# Patient Record
Sex: FEMALE | Race: AMERICAN INDIAN OR ALASKA NATIVE
[De-identification: names, ages, dates, MRNs, and addresses within clinical notes are randomized per-mention and may not be internally consistent; named-entity substitution may affect disease eponyms.]

---

## 2018-03-01 ENCOUNTER — HOSPITAL ENCOUNTER (EMERGENCY)
Dept: HOSPITAL 5 - ED | Age: 63
LOS: 1 days | Discharge: HOME | End: 2018-03-02
Payer: COMMERCIAL

## 2018-03-01 DIAGNOSIS — E11.9: ICD-10-CM

## 2018-03-01 DIAGNOSIS — I10: Primary | ICD-10-CM

## 2018-03-01 DIAGNOSIS — I11.0: ICD-10-CM

## 2018-03-01 DIAGNOSIS — I50.9: ICD-10-CM

## 2018-03-01 DIAGNOSIS — M54.32: ICD-10-CM

## 2018-03-01 PROCEDURE — 96372 THER/PROPH/DIAG INJ SC/IM: CPT

## 2018-03-01 PROCEDURE — 73560 X-RAY EXAM OF KNEE 1 OR 2: CPT

## 2018-03-01 PROCEDURE — 73502 X-RAY EXAM HIP UNI 2-3 VIEWS: CPT

## 2018-03-01 PROCEDURE — 99283 EMERGENCY DEPT VISIT LOW MDM: CPT

## 2018-03-01 NOTE — XRAY REPORT
FINAL REPORT



EXAM:  XR HIP 2-3V RT



HISTORY:  Right hip and knee pain. 



TECHNIQUE:  Two views: AP view of the pelvis and frogleg view of

the right hip. 



PRIORS:  None available 



FINDINGS:  

Decreased osseous mineralization. There is no displaced fracture

involving either hip or the osseous pelvis. Mild bilateral hip

degenerative changes and sacroiliac joint degenerative changes.

Multiple coarse calcifications overlie the sacrum, probable

fibroid uterus. Atherosclerotic vascular calcifications of the

femoral arteries and left groin surgical clips present. 



IMPRESSION:  

No acute fracture or dislocation. 



Probable calcified uterine fibroids. 



Comment: If high index of suspicion for hip fracture,

cross-sectional imaging can further evaluate.

## 2018-03-01 NOTE — XRAY REPORT
FINAL REPORT



PROCEDURE:  XR KNEE 1-2V RT



TECHNIQUE:  RIGHT knee radiographs, AP and lateral views. CPT

[id number]







HISTORY:  Right hip pain. 



COMPARISON:  No prior studies are available for comparison.



FINDINGS:  

Fracture (s) and/or Dislocation(s): None .



Alignment: Normal .



Joint space(s): Severe medial compartment narrowing with

sclerosis and subchondral cystic change. Small osteophytes.

Moderate lateral and patellofemoral narrowing and osteophytes.

Small patellar enthesophyte. Small joint effusion. 



Soft tissues: Vascular calcification.



Bone mineralization: Osteopenia.



Foreign bodies: None .







IMPRESSION:  

Osteopenia and degenerative changes of the right knee..

## 2018-03-01 NOTE — EMERGENCY DEPARTMENT REPORT
ED General Adult HPI





- General


Chief complaint: Extremity Injury, Lower


Stated complaint: RT LEG PAIN


Time Seen by Provider: 03/01/18 23:26


Source: patient, family


Mode of arrival: Wheelchair


Limitations: Physical Limitation





- History of Present Illness


Initial comments: 





Patient is 62 years old female history of hypertension diabetes and CABG.  

Patient presented to the ER with chief complaint of right lower extremity pain 

for the last 3 months patient hadsciatica in neuropathy as she is on gabapentin 

she stated that her symptoms are getting worse.  Patient denied any new injury.

  No shortness of breath or swelling in the leg.  Patient also had high blood 

pressure 213/150 she stated that she took her blood pressure medicine today.





- Related Data


 Allergies











Allergy/AdvReac Type Severity Reaction Status Date / Time


 


No Known Allergies Allergy   Unverified 03/01/18 19:24














ED Review of Systems


ROS: 


Stated complaint: RT LEG PAIN


Other details as noted in HPI





Comment: All other systems reviewed and negative


Constitutional: denies: chills, fever


Respiratory: denies: cough, orthopnea, shortness of breath, SOB with exertion


Cardiovascular: denies: chest pain, palpitations, dyspnea on exertion, orthopnea


Gastrointestinal: denies: abdominal pain, nausea, vomiting, diarrhea


Genitourinary: denies: urgency, dysuria, frequency


Musculoskeletal: arthralgia.  denies: back pain


Neurological: denies: headache, weakness, numbness, paresthesias





ED Past Medical Hx





- Past Medical History


Previous Medical History?: Yes


Hx Hypertension: Yes


Hx Congestive Heart Failure: Yes


Hx Diabetes: Yes





- Surgical History


Past Surgical History?: Yes


Additional Surgical History: Heart





- Social History


Smoking Status: Never Smoker


Substance Use Type: None





ED Physical Exam





- General


Limitations: Physical Limitation


General appearance: alert, in no apparent distress





- Head


Head exam: Present: atraumatic, normocephalic





- Eye


Eye exam: Present: normal appearance, PERRL





- ENT


ENT exam: Present: normal exam, normal orophraynx, mucous membranes moist





- Neck


Neck exam: Present: normal inspection, full ROM.  Absent: tenderness, 

meningismus





- Respiratory


Respiratory exam: Present: normal lung sounds bilaterally.  Absent: respiratory 

distress, wheezes, rales, rhonchi, accessory muscle use, decreased breath sounds

, prolonged expiratory





- Cardiovascular


Cardiovascular Exam: Present: regular rate, normal rhythm, normal heart sounds





- GI/Abdominal


GI/Abdominal exam: Present: soft, normal bowel sounds.  Absent: distended, 

tenderness, guarding, rebound, rigid, organomegaly, mass, bruit, pulsatile mass





- Extremities Exam


Extremities exam: Present: normal inspection, full ROM, normal capillary 

refill.  Absent: tenderness, pedal edema





- Back Exam


Back exam: Present: normal inspection, full ROM.  Absent: CVA tenderness (R), 

CVA tenderness (L), muscle spasm, paraspinal tenderness, vertebral tenderness, 

rash noted





- Neurological Exam


Neurological exam: Present: alert, oriented X3, CN II-XII intact, normal gait





- Skin


Skin exam: Present: warm, intact, normal color





ED Course





 Vital Signs











  03/01/18





  19:24


 


Temperature 98.2 F


 


Pulse Rate 104 H


 


Respiratory 18





Rate 


 


Blood Pressure 219/107


 


O2 Sat by Pulse 97





Oximetry 














ED Medical Decision Making





- Radiology Data


Radiology results: report reviewed





X-ray of the right hip and right knee showed no acute fracture or dislocation.


Critical care attestation.: 


If time is entered above; I have spent that time in minutes in the direct care 

of this critically ill patient, excluding procedure time.








ED Disposition


Clinical Impression: 


 Malignant hypertension, Sciatic leg pain





Disposition: DC-01 TO HOME OR SELFCARE


Is pt being admited?: No


Condition: Stable


Instructions:  Hypertension (ED), Sciatica (ED)


Referrals: 


PRIMARY CARE,MD [Primary Care Provider] - 3-5 Days

## 2018-03-02 VITALS — SYSTOLIC BLOOD PRESSURE: 188 MMHG | DIASTOLIC BLOOD PRESSURE: 97 MMHG
